# Patient Record
Sex: MALE | Race: WHITE | URBAN - METROPOLITAN AREA
[De-identification: names, ages, dates, MRNs, and addresses within clinical notes are randomized per-mention and may not be internally consistent; named-entity substitution may affect disease eponyms.]

---

## 2017-05-02 ENCOUNTER — EMERGENCY (EMERGENCY)
Facility: HOSPITAL | Age: 32
LOS: 1 days | Discharge: HOME | End: 2017-05-02
Attending: EMERGENCY MEDICINE | Admitting: EMERGENCY MEDICINE
Payer: COMMERCIAL

## 2017-05-02 VITALS
DIASTOLIC BLOOD PRESSURE: 84 MMHG | TEMPERATURE: 99 F | HEART RATE: 92 BPM | RESPIRATION RATE: 19 BRPM | WEIGHT: 199.96 LBS | OXYGEN SATURATION: 98 % | SYSTOLIC BLOOD PRESSURE: 129 MMHG | HEIGHT: 69 IN

## 2017-05-02 DIAGNOSIS — S42.402A UNSPECIFIED FRACTURE OF LOWER END OF LEFT HUMERUS, INITIAL ENCOUNTER FOR CLOSED FRACTURE: ICD-10-CM

## 2017-05-02 DIAGNOSIS — Z88.0 ALLERGY STATUS TO PENICILLIN: ICD-10-CM

## 2017-05-02 DIAGNOSIS — Z88.4 ALLERGY STATUS TO ANESTHETIC AGENT: ICD-10-CM

## 2017-05-02 DIAGNOSIS — M25.522 PAIN IN LEFT ELBOW: ICD-10-CM

## 2017-05-02 PROCEDURE — 24576 CLTX HUMRL CNDYLR FX WO MNPJ: CPT | Mod: 54

## 2017-05-02 PROCEDURE — 73200 CT UPPER EXTREMITY W/O DYE: CPT | Mod: 26,LT

## 2017-05-02 PROCEDURE — 99284 EMERGENCY DEPT VISIT MOD MDM: CPT | Mod: 25

## 2017-05-02 PROCEDURE — 73080 X-RAY EXAM OF ELBOW: CPT | Mod: 26,LT

## 2017-05-02 RX ORDER — OXYCODONE HYDROCHLORIDE 5 MG/1
1 TABLET ORAL
Qty: 18 | Refills: 0 | OUTPATIENT
Start: 2017-05-02 | End: 2017-05-05

## 2017-05-02 RX ORDER — MORPHINE SULFATE 50 MG/1
6 CAPSULE, EXTENDED RELEASE ORAL ONCE
Qty: 0 | Refills: 0 | Status: DISCONTINUED | OUTPATIENT
Start: 2017-05-02 | End: 2017-05-02

## 2017-05-02 RX ORDER — IBUPROFEN 200 MG
600 TABLET ORAL ONCE
Qty: 0 | Refills: 0 | Status: DISCONTINUED | OUTPATIENT
Start: 2017-05-02 | End: 2017-05-02

## 2017-05-02 RX ADMIN — MORPHINE SULFATE 6 MILLIGRAM(S): 50 CAPSULE, EXTENDED RELEASE ORAL at 22:47

## 2017-05-02 RX ADMIN — MORPHINE SULFATE 6 MILLIGRAM(S): 50 CAPSULE, EXTENDED RELEASE ORAL at 21:44

## 2017-05-02 NOTE — ED PROVIDER NOTE - UPPER EXTREMITY EXAM, LEFT
palpable edema with deformity over lateral L elbow. distal median/radial/ulnar nn intact to motor and sensory/LIMITED ROM/BRUISING/SWELLING/JOINT SWELLING/TENDERNESS/DEFORMITY

## 2017-05-02 NOTE — ED PROVIDER NOTE - NOTES
spoke to dr madrid, evaluated the xrays, will see the patient in the morning at 9 am, for outpatient evaluation and booking for or. patient is nv intact, placed posterior splint as per recommendations and ct upper extremity

## 2017-05-02 NOTE — ED PROCEDURE NOTE - NS ED PERI VASCULAR NEG
no swelling/capillary refill time < 2 seconds/no cyanosis of extremity/no paresthesia/fingers/toes warm to touch

## 2017-05-02 NOTE — ED ADULT TRIAGE NOTE - CHIEF COMPLAINT QUOTE
patient presents to ED complaining of of L elbow pain after falling and landing on a turf on concrete while playing soft ball

## 2017-05-02 NOTE — ED ADULT NURSE NOTE - OBJECTIVE STATEMENT
32y male, came in with complaint of left arm pain s/p fall while playing football. denies head trauma/injury. no abrasions/lacerations. no LOC. Pain is 6/10 pain score, aching, worsens with movement. neurovascular status intact.

## 2017-05-02 NOTE — ED PROVIDER NOTE - OBJECTIVE STATEMENT
Patient previously well, presents with L elbow pain and deformity s/p fall while playing softball onto turf. notes no neck or head injury, denies numbness, paresthesias. pain localized to L elbow, radiates into shoulder and wrist with movement. R hand dominant. denies cp, sob or abd pain. denies prior injury to the L extremity.

## 2017-05-05 ENCOUNTER — APPOINTMENT (OUTPATIENT)
Dept: PULMONOLOGY | Facility: CLINIC | Age: 32
End: 2017-05-05

## 2017-05-05 PROBLEM — Z00.00 ENCOUNTER FOR PREVENTIVE HEALTH EXAMINATION: Status: ACTIVE | Noted: 2017-05-05

## 2017-05-09 VITALS
SYSTOLIC BLOOD PRESSURE: 143 MMHG | RESPIRATION RATE: 20 BRPM | OXYGEN SATURATION: 98 % | DIASTOLIC BLOOD PRESSURE: 77 MMHG | TEMPERATURE: 98 F | HEIGHT: 69 IN | HEART RATE: 82 BPM | WEIGHT: 201.06 LBS

## 2017-05-10 ENCOUNTER — OUTPATIENT (OUTPATIENT)
Dept: OUTPATIENT SERVICES | Facility: HOSPITAL | Age: 32
LOS: 1 days | Discharge: HOME | End: 2017-05-10
Payer: COMMERCIAL

## 2017-05-10 VITALS
OXYGEN SATURATION: 98 % | SYSTOLIC BLOOD PRESSURE: 134 MMHG | TEMPERATURE: 98 F | HEART RATE: 92 BPM | RESPIRATION RATE: 16 BRPM | DIASTOLIC BLOOD PRESSURE: 80 MMHG

## 2017-05-10 RX ORDER — MORPHINE SULFATE 50 MG/1
4 CAPSULE, EXTENDED RELEASE ORAL
Qty: 0 | Refills: 0 | Status: DISCONTINUED | OUTPATIENT
Start: 2017-05-10 | End: 2017-05-11

## 2017-05-10 RX ADMIN — MORPHINE SULFATE 4 MILLIGRAM(S): 50 CAPSULE, EXTENDED RELEASE ORAL at 14:54

## 2017-05-10 RX ADMIN — MORPHINE SULFATE 4 MILLIGRAM(S): 50 CAPSULE, EXTENDED RELEASE ORAL at 15:10

## 2017-05-10 NOTE — PACU DISCHARGE NOTE - COMMENTS
1700-discharge instruction given-verbalized understanding, IV fluid discontinued,and site kept clean and dry, left in stable condition T-97.2, P-89, R-16, BP- 148/79, O2 sat-97%

## 2017-05-10 NOTE — BRIEF OPERATIVE NOTE - PROCEDURE
Open reduction and internal fixation (ORIF) of elbow  05/10/2017  Single column fracture ORIF  Active  GOLDIE

## 2017-05-10 NOTE — PACU DISCHARGE NOTE - COMMENTS
1600- discharge instruction given by ALDO Hart, T-97.3, P-54, R-14, BP-136/88, O2 sat- 97%, IV fluid discontinued and sit kept clean and dry, left in stable condition with crutches

## 2017-05-11 PROCEDURE — 76000 FLUOROSCOPY <1 HR PHYS/QHP: CPT

## 2017-05-11 PROCEDURE — C1769: CPT

## 2017-05-11 PROCEDURE — 64718 REVISE ULNAR NERVE AT ELBOW: CPT | Mod: RT

## 2017-05-11 PROCEDURE — C1713: CPT

## 2017-05-11 PROCEDURE — 24579 OPTX HUMRL CNDYLR FRACTURE: CPT | Mod: RT

## 2017-05-16 DIAGNOSIS — S42.455A NONDISPLACED FRACTURE OF LATERAL CONDYLE OF LEFT HUMERUS, INITIAL ENCOUNTER FOR CLOSED FRACTURE: ICD-10-CM
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
## 2017-05-19 ENCOUNTER — OUTPATIENT (OUTPATIENT)
Dept: OUTPATIENT SERVICES | Facility: HOSPITAL | Age: 32
LOS: 1 days | End: 2017-05-19
Payer: COMMERCIAL

## 2017-05-19 PROCEDURE — 73080 X-RAY EXAM OF ELBOW: CPT

## 2017-05-19 PROCEDURE — 73080 X-RAY EXAM OF ELBOW: CPT | Mod: 26,LT

## 2017-06-16 ENCOUNTER — OUTPATIENT (OUTPATIENT)
Dept: OUTPATIENT SERVICES | Facility: HOSPITAL | Age: 32
LOS: 1 days | End: 2017-06-16
Payer: COMMERCIAL

## 2017-06-16 PROCEDURE — 73080 X-RAY EXAM OF ELBOW: CPT | Mod: 26,LT

## 2017-06-16 PROCEDURE — 73080 X-RAY EXAM OF ELBOW: CPT

## 2017-07-28 ENCOUNTER — OUTPATIENT (OUTPATIENT)
Dept: OUTPATIENT SERVICES | Facility: HOSPITAL | Age: 32
LOS: 1 days | End: 2017-07-28
Payer: COMMERCIAL

## 2017-07-28 PROCEDURE — 73080 X-RAY EXAM OF ELBOW: CPT

## 2017-07-28 PROCEDURE — 73080 X-RAY EXAM OF ELBOW: CPT | Mod: 26,LT

## 2025-03-25 NOTE — ED PROVIDER NOTE - CPE EDP [facility] NORM
FAMILY HISTORY:  Father  Still living? No  FH: Alzheimers disease, Age at diagnosis: Age Unknown    Mother  Still living? No  FH: rheumatoid arthritis, Age at diagnosis: Age Unknown     - - -